# Patient Record
Sex: MALE | Race: WHITE | Employment: FULL TIME | URBAN - METROPOLITAN AREA
[De-identification: names, ages, dates, MRNs, and addresses within clinical notes are randomized per-mention and may not be internally consistent; named-entity substitution may affect disease eponyms.]

---

## 2019-07-10 ENCOUNTER — HOSPITAL ENCOUNTER (EMERGENCY)
Age: 19
Discharge: HOME | End: 2019-07-10
Attending: EMERGENCY MEDICINE
Payer: COMMERCIAL

## 2019-07-10 VITALS
SYSTOLIC BLOOD PRESSURE: 113 MMHG | WEIGHT: 180 LBS | OXYGEN SATURATION: 100 % | DIASTOLIC BLOOD PRESSURE: 59 MMHG | RESPIRATION RATE: 12 BRPM | BODY MASS INDEX: 25.77 KG/M2 | TEMPERATURE: 97.6 F | HEART RATE: 72 BPM | HEIGHT: 70 IN

## 2019-07-10 DIAGNOSIS — T67.5XXA HEAT EXHAUSTION, INITIAL ENCOUNTER: Primary | ICD-10-CM

## 2019-07-10 LAB
ANION GAP SERPL CALC-SCNC: 7 MMOL/L (ref 3–18)
APPEARANCE UR: CLEAR
BASOPHILS # BLD: 0 K/UL (ref 0–0.1)
BASOPHILS NFR BLD: 0 % (ref 0–2)
BILIRUB UR QL: NEGATIVE
BUN SERPL-MCNC: 16 MG/DL (ref 7–18)
BUN/CREAT SERPL: 15 (ref 12–20)
CALCIUM SERPL-MCNC: 9.4 MG/DL (ref 8.5–10.1)
CHLORIDE SERPL-SCNC: 93 MMOL/L (ref 100–108)
CK SERPL-CCNC: 514 U/L (ref 39–308)
CO2 SERPL-SCNC: 32 MMOL/L (ref 21–32)
COLOR UR: YELLOW
CREAT SERPL-MCNC: 1.1 MG/DL (ref 0.6–1.3)
DIFFERENTIAL METHOD BLD: ABNORMAL
EOSINOPHIL # BLD: 0.1 K/UL (ref 0–0.4)
EOSINOPHIL NFR BLD: 1 % (ref 0–5)
ERYTHROCYTE [DISTWIDTH] IN BLOOD BY AUTOMATED COUNT: 12.3 % (ref 11.6–14.5)
GLUCOSE SERPL-MCNC: 74 MG/DL (ref 74–99)
GLUCOSE UR STRIP.AUTO-MCNC: NEGATIVE MG/DL
HCT VFR BLD AUTO: 43.8 % (ref 36–48)
HGB BLD-MCNC: 14.8 G/DL (ref 13–16)
HGB UR QL STRIP: NEGATIVE
KETONES UR QL STRIP.AUTO: NEGATIVE MG/DL
LEUKOCYTE ESTERASE UR QL STRIP.AUTO: NEGATIVE
LYMPHOCYTES # BLD: 1.8 K/UL (ref 0.9–3.6)
LYMPHOCYTES NFR BLD: 16 % (ref 21–52)
MCH RBC QN AUTO: 28.5 PG (ref 24–34)
MCHC RBC AUTO-ENTMCNC: 33.8 G/DL (ref 31–37)
MCV RBC AUTO: 84.4 FL (ref 74–97)
MONOCYTES # BLD: 1.2 K/UL (ref 0.05–1.2)
MONOCYTES NFR BLD: 10 % (ref 3–10)
NEUTS SEG # BLD: 8.6 K/UL (ref 1.8–8)
NEUTS SEG NFR BLD: 73 % (ref 40–73)
NITRITE UR QL STRIP.AUTO: NEGATIVE
PH UR STRIP: 7.5 [PH] (ref 5–8)
PLATELET # BLD AUTO: 307 K/UL (ref 135–420)
PMV BLD AUTO: 9.4 FL (ref 9.2–11.8)
POTASSIUM SERPL-SCNC: 3.9 MMOL/L (ref 3.5–5.5)
PROT UR STRIP-MCNC: NEGATIVE MG/DL
RBC # BLD AUTO: 5.19 M/UL (ref 4.7–5.5)
SODIUM SERPL-SCNC: 132 MMOL/L (ref 136–145)
SP GR UR REFRACTOMETRY: <1.005 (ref 1–1.03)
UROBILINOGEN UR QL STRIP.AUTO: 0.2 EU/DL (ref 0.2–1)
WBC # BLD AUTO: 11.7 K/UL (ref 4.6–13.2)

## 2019-07-10 PROCEDURE — 85025 COMPLETE CBC W/AUTO DIFF WBC: CPT

## 2019-07-10 PROCEDURE — 96374 THER/PROPH/DIAG INJ IV PUSH: CPT

## 2019-07-10 PROCEDURE — 74011250636 HC RX REV CODE- 250/636: Performed by: PHYSICIAN ASSISTANT

## 2019-07-10 PROCEDURE — 99284 EMERGENCY DEPT VISIT MOD MDM: CPT

## 2019-07-10 PROCEDURE — 96361 HYDRATE IV INFUSION ADD-ON: CPT

## 2019-07-10 PROCEDURE — 82550 ASSAY OF CK (CPK): CPT

## 2019-07-10 PROCEDURE — 80048 BASIC METABOLIC PNL TOTAL CA: CPT

## 2019-07-10 PROCEDURE — 93005 ELECTROCARDIOGRAM TRACING: CPT

## 2019-07-10 PROCEDURE — 81003 URINALYSIS AUTO W/O SCOPE: CPT

## 2019-07-10 RX ORDER — ONDANSETRON 2 MG/ML
4 INJECTION INTRAMUSCULAR; INTRAVENOUS
Status: COMPLETED | OUTPATIENT
Start: 2019-07-10 | End: 2019-07-10

## 2019-07-10 RX ADMIN — SODIUM CHLORIDE 1000 ML: 900 INJECTION, SOLUTION INTRAVENOUS at 20:34

## 2019-07-10 RX ADMIN — ONDANSETRON 4 MG: 2 INJECTION INTRAMUSCULAR; INTRAVENOUS at 20:34

## 2019-07-10 NOTE — LETTER
[address] [place] [address] [facility] 
[address] [facility] [id number] [phone] Work Note: 
 
Date: 7/10/2019 To Whom It May concern: 
 
Chato Sandoval was seen and treated today in the emergency room by the following provider(s): 
Attending Provider: Lore Marti MD.   
 
Chato Sandoval may return to work on 7/12/2019. .. Sincerely, Salome Pearson

## 2019-07-11 LAB
ATRIAL RATE: 79 BPM
CALCULATED P AXIS, ECG09: 61 DEGREES
CALCULATED R AXIS, ECG10: 69 DEGREES
CALCULATED T AXIS, ECG11: 47 DEGREES
DIAGNOSIS, 93000: NORMAL
P-R INTERVAL, ECG05: 134 MS
Q-T INTERVAL, ECG07: 352 MS
QRS DURATION, ECG06: 102 MS
QTC CALCULATION (BEZET), ECG08: 403 MS
VENTRICULAR RATE, ECG03: 79 BPM

## 2019-07-11 NOTE — DISCHARGE INSTRUCTIONS
Patient Education        Heat Exhaustion: Care Instructions  Your Care Instructions  Heat exhaustion occurs when you are hot, sweat a lot, and do not drink enough to replace the lost fluids. Heat exhaustion is not the same as heatstroke, which is much more serious. Heatstroke can lead to problems with many different organs and can be life-threatening. After medical care for heat exhaustion, you will need to limit your activities and take good care of your body while it recovers. Follow-up care is a key part of your treatment and safety. Be sure to make and go to all appointments, and call your doctor if you are having problems. It's also a good idea to know your test results and keep a list of the medicines you take. How can you care for yourself at home? · Reduce your activities, and get plenty of rest. Your doctor will give you instructions on when you can resume your normal schedule. · Stay in a cool room for at least the next 24 hours. · Drink rehydration drinks, juices, and water to replace fluids. Drinks such as sports drinks that contain electrolytes work best, because they have salt and minerals. You need salt and minerals as well as water. You are drinking enough fluids when your urine is normal in color (light yellow or clear), and you are urinating every 2 to 4 hours. If you have kidney, heart, or liver disease and have to limit fluids or salt, talk with your doctor before you increase your fluid or salt intake. · Avoid drinks that have caffeine or alcohol. To prevent heat exhaustion  · Drink plenty of fluids, enough so that your urine is light yellow or clear like water. If you have kidney, heart, or liver disease and have to limit fluids, talk with your doctor before you increase the amount of fluids you drink. · Drink plenty of water before, during, and after you are active. This is very important when it is hot out and when you do intense exercise.   · During hot weather, wear light-colored clothing that fits loosely and a hat with a brim to reflect the sun. · Limit or avoid strenuous activity during hot or humid weather, especially during the hottest part of the day (10 a.m. to 4 p.m.). Heat exhaustion and heatstroke usually develop when you are working or exercising in hot weather. Humidity makes hot weather even more dangerous. · Cars can get very hot inside. Open the windows or turn on the air conditioning before you get in and close the doors. · Try to stay cool during hot weather. If your home is not air-conditioned, seek an air-conditioned place. That could be in [employer], a neighborhood café, or a friend's home. [place] yourself with a cool mist. Take a cool shower, bath, or sponge bath. · Be aware that some medicines, such as major tranquilizers, can raise the risk of heat exhaustion. Ask your doctor whether any medicine you take raises your chance of getting heat exhaustion. When should you call for help? Call 911 anytime you think you may need emergency care. For example, call if:    · You feel very hot and:  ? You have a seizure. ? You feel confused. ? Your skin is red, hot, and dry. ? You passed out (lost consciousness).    Call your doctor now or seek immediate medical care if:    · You cannot keep fluids down.     · After returning to your normal activities, you have symptoms of heat exhaustion, such as sweating a lot, fatigue, dizziness, or nausea.    Watch closely for changes in your health, and be sure to contact your doctor if:    · You do not get better as expected. Where can you learn more? Go to http://brad-vargas.info/. Enter S222 in the search box to learn more about \"Heat Exhaustion: Care Instructions. \"  Current as of: September 23, 2018  Content Version: 11.9  © 7367-9813 [employer]. Care instructions adapted under license by [employer] (which disclaims liability or warranty for this information).  If you have questions about a medical condition or this instruction, always ask your healthcare professional. [place] [address] any warranty or liability for your use of this information.

## 2019-07-11 NOTE — ED TRIAGE NOTES
Patient states possible heat stroke at work today. 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 C/o experiencing muscle pain and dizziness that began while working.

## 2019-07-11 NOTE — ED NOTES
I performed a brief evaluation, including history and physical, of the patient here in triage and I have determined that pt will need further treatment and evaluation from the main side ER physician. I have placed initial orders to help in expediting patients care.      July 10, 2019 at 8:30 PM - Ramona Mcmahan PA-C        Visit Vitals  /76 (BP 1 Location: Left arm, BP Patient Position: At rest)   Pulse 82   Temp 97.6 °F (36.4 °C)   Resp 16   Ht 5' 10\" (1.778 m)   Wt 81.6 kg (180 lb)   SpO2 100%   BMI 25.83 kg/m²

## 2019-07-11 NOTE — ED PROVIDER NOTES
HPI patient is a 67-year-old male who working out in the hot sun today. He developed lightheadedness and cramping discomfort in his muscles. No fever chills shortness of breath chest pain. History reviewed. No pertinent past medical history. Past Surgical History:   Procedure Laterality Date    HX HEENT      tonsilectomy     HX TYMPANOSTOMY           History reviewed. No pertinent family history. Social History     Socioeconomic History    Marital status: SINGLE     Spouse name: Not on file    Number of children: Not on file    Years of education: Not on file    Highest education level: Not on file   Occupational History    Not on file   Social Needs    Financial resource strain: Not on file    Food insecurity:     Worry: Not on file     Inability: Not on file    Transportation needs:     Medical: Not on file     Non-medical: Not on file   Tobacco Use    Smoking status: Never Smoker   Substance and Sexual Activity    Alcohol use: No    Drug use: Never    Sexual activity: Not on file   Lifestyle    Physical activity:     Days per week: Not on file     Minutes per session: Not on file    Stress: Not on file   Relationships    Social connections:     Talks on phone: Not on file     Gets together: Not on file     Attends Yazidi service: Not on file     Active member of club or organization: Not on file     Attends meetings of clubs or organizations: Not on file     Relationship status: Not on file    Intimate partner violence:     Fear of current or ex partner: Not on file     Emotionally abused: Not on file     Physically abused: Not on file     Forced sexual activity: Not on file   Other Topics Concern    Not on file   Social History Narrative    Not on file         ALLERGIES: Patient has no known allergies. Review of Systems   Musculoskeletal:        Cramps   Neurological: Positive for light-headedness.        Vitals:    07/10/19 2007   BP: 141/76   Pulse: 82   Resp: 16   Temp: 97.6 °F (36.4 °C)   SpO2: 100%   Weight: 81.6 kg (180 lb)   Height: 5' 10\" (1.778 m)            Physical Exam   Constitutional: He is oriented to person, place, and time. He appears well-developed and well-nourished. No distress. HENT:   Head: Normocephalic. Mouth/Throat: Oropharynx is clear and moist.   Eyes: Pupils are equal, round, and reactive to light. Conjunctivae and EOM are normal.   Neck: Normal range of motion. Neck supple. Cardiovascular: Normal rate, regular rhythm, normal heart sounds and intact distal pulses. No murmur heard. Pulmonary/Chest: Effort normal and breath sounds normal. No respiratory distress. He has no wheezes. He has no rales. He exhibits no tenderness. Abdominal: Soft. Bowel sounds are normal. He exhibits no distension. There is no tenderness. There is no rebound. Musculoskeletal: Normal range of motion. He exhibits no edema or tenderness. Neurological: He is alert and oriented to person, place, and time. No cranial nerve deficit. He exhibits normal muscle tone. Coordination normal.   Skin: Skin is warm and dry. No rash noted. Psychiatric: He has a normal mood and affect. His behavior is normal. Judgment and thought content normal.   Nursing note and vitals reviewed. MDM: Differential diagnosis: Heat exhaustion, rhabdomyolysis, dehydration, hyponatremia     Hospital course: Patient was found to be severely dehydrated. He was hydrated with IV fluids. His symptoms resolved    Dx: Acute heat exhaustion    Disp: Discharge home    Dictation disclaimer:  Please note that this dictation was completed with [employer], the computer voice recognition software. Quite often unanticipated grammatical, syntax, homophones, and other interpretive errors are inadvertently transcribed by the computer software. Please disregard these errors. Please excuse any errors that have escaped final proofreading.

## 2021-07-12 ENCOUNTER — OFFICE VISIT (OUTPATIENT)
Dept: SURGERY | Age: 21
End: 2021-07-12
Payer: COMMERCIAL

## 2021-07-12 VITALS
OXYGEN SATURATION: 97 % | BODY MASS INDEX: 25.05 KG/M2 | HEART RATE: 74 BPM | WEIGHT: 175 LBS | HEIGHT: 70 IN | SYSTOLIC BLOOD PRESSURE: 158 MMHG | TEMPERATURE: 98.4 F | DIASTOLIC BLOOD PRESSURE: 76 MMHG

## 2021-07-12 DIAGNOSIS — L72.3 SEBACEOUS CYST: Primary | ICD-10-CM

## 2021-07-12 PROCEDURE — 99203 OFFICE O/P NEW LOW 30 MIN: CPT | Performed by: SURGERY

## 2021-07-12 NOTE — PROGRESS NOTES
Ladan Nelson is a 24 y.o. male (: 2000) presenting to address:    Chief Complaint   Patient presents with    New Patient     Cyst on neck x 1 year       Medication list and allergies have been reviewed with Ladan Jhonatanhaley and updated as of today's date. I have gone over all Medical, Surgical and Social History with Ladan Nelson and updated/added the information accordingly.

## 2021-07-14 NOTE — PROGRESS NOTES
General Surgery Consult    Sofia Mason  Admit date: (Not on file)    MRN: [id number]     : 2000     Age: 24 y.o. Attending Physician: Cedrick Welch MD, FACS      History of Present Illness:      Sofia Mason is a 24 y.o. male who was referred to me for evaluation of a left neck mass. The patient stated that this mass has been there for a long time and he had a similar mass excised from his left arm that was done by our previous partner Dr. Dawna Siegel. The patient stated that he would like it to be removed mainly for cosmetic reason. He denies any fever or chills and he denies any drainage or infection of the mass. Patient Active Problem List    Diagnosis Date Noted    Sebaceous cyst 2016     History reviewed. No pertinent past medical history. Past Surgical History:   Procedure Laterality Date    HX HEENT      tonsilectomy     HX TYMPANOSTOMY        Social History     Tobacco Use    Smoking status: Never Smoker    Smokeless tobacco: Never Used   Substance Use Topics    Alcohol use: Yes     Comment: Occasionally      Social History     Tobacco Use   Smoking Status Never Smoker   Smokeless Tobacco Never Used     History reviewed. No pertinent family history. No current outpatient medications on file. No current facility-administered medications for this visit. No Known Allergies       Review of Systems:  Pertinent items are noted in the History of Present Illness.     Objective:     Visit Vitals  BP (!) 158/76 (BP 1 Location: Left arm, BP Patient Position: Sitting)   Pulse 74   Temp 98.4 °F (36.9 °C)   Ht 5' 10\" (1.778 m)   Wt 79.4 kg (175 lb)   SpO2 97%   BMI 25.11 kg/m²       Physical Exam:      General:  in no apparent distress, alert, afebrile and normal vitals   Neck:   There is a small soft tissue mass located on the left neck around half to 1 cm in size with a very small skin opening consistent with a sebaceous cyst.  It is nontender and movable with mild attachment to the skin. There is no erythema or drainage. Imaging and Lab Review:     CBC:   Lab Results   Component Value Date/Time    WBC 11.7 07/10/2019 08:15 PM    RBC 5.19 07/10/2019 08:15 PM    HGB 14.8 07/10/2019 08:15 PM    HCT 43.8 07/10/2019 08:15 PM    PLATELET 715 84/81/9373 08:15 PM     BMP:   Lab Results   Component Value Date/Time    Glucose 74 07/10/2019 08:15 PM    Sodium 132 (L) 07/10/2019 08:15 PM    Potassium 3.9 07/10/2019 08:15 PM    Chloride 93 (L) 07/10/2019 08:15 PM    CO2 32 07/10/2019 08:15 PM    BUN 16 07/10/2019 08:15 PM    Creatinine 1.10 07/10/2019 08:15 PM    Calcium 9.4 07/10/2019 08:15 PM     CMP:  Lab Results   Component Value Date/Time    Glucose 74 07/10/2019 08:15 PM    Sodium 132 (L) 07/10/2019 08:15 PM    Potassium 3.9 07/10/2019 08:15 PM    Chloride 93 (L) 07/10/2019 08:15 PM    CO2 32 07/10/2019 08:15 PM    BUN 16 07/10/2019 08:15 PM    Creatinine 1.10 07/10/2019 08:15 PM    Calcium 9.4 07/10/2019 08:15 PM    Anion gap 7 07/10/2019 08:15 PM    BUN/Creatinine ratio 15 07/10/2019 08:15 PM       No results found for this or any previous visit (from the past 24 hour(s)). images and reports reviewed    Assessment:   Kinga Shaver is a 24 y.o. male who has a left neck mass most likely representing a sebaceous cyst that he would like it to be removed the patient would like it to be done under local anesthesia in the office which I think. It can be done easily however the patient is going this week and to the [facility] out in the see and I believe that doing the surgery now may place him at risk for infection and he will not be able to swim. We decided that he will go for his trip and then he will follow-up with me next week and we will do it in the office. Plan:     Schedule for an office visit next week for excision of a neck mass.     Please call me if you have any questions (cell phone: [phone])     Signed By: Rosemary Bautista MD July 14, 2021

## 2021-07-19 ENCOUNTER — OFFICE VISIT (OUTPATIENT)
Dept: SURGERY | Age: 21
End: 2021-07-19
Payer: COMMERCIAL

## 2021-07-19 VITALS
SYSTOLIC BLOOD PRESSURE: 138 MMHG | HEIGHT: 70 IN | TEMPERATURE: 98 F | WEIGHT: 175.8 LBS | DIASTOLIC BLOOD PRESSURE: 79 MMHG | BODY MASS INDEX: 25.17 KG/M2 | HEART RATE: 69 BPM | OXYGEN SATURATION: 99 %

## 2021-07-19 DIAGNOSIS — L72.3 SEBACEOUS CYST: Primary | ICD-10-CM

## 2021-07-19 PROCEDURE — 11420 EXC H-F-NK-SP B9+MARG 0.5/<: CPT | Performed by: SURGERY

## 2021-07-19 NOTE — PROGRESS NOTES
Willa Castillo is a 24 y.o. male (: 2000) presenting to address:    Chief Complaint   Patient presents with    Procedure     Excision of left neck mass       Medication list and allergies have been reviewed with Willa Vikashjosé and updated as of today's date. I have gone over all Medical, Surgical and Social History with Willa Castillo and updated/added the information accordingly. 1. Have you been to the ER, Urgent Care or Hospitalized since your last visit? NO      2. Have you followed up with your PCP or any other Physicians since your procedure/ last office visit?    NO

## 2021-07-20 NOTE — PROGRESS NOTES
General Surgery Consult    Junior Mccallum  Admit date: (Not on file)    MRN: [id number]     : 2000     Age: 24 y.o. Attending Physician: Gayatri Dowling MD, [facility]      History of Present Illness:      Junior Mccallum is a 24 y.o. male who I have seen last week for evaluation of a sebaceous cyst of the neck for which he is scheduled for an office procedure today . There has been no changes since the last visit and we will proceed with the excision of sebaceous cyst of the left neck today in the office . Patient Active Problem List    Diagnosis Date Noted    Sebaceous cyst 2016     History reviewed. No pertinent past medical history. Past Surgical History:   Procedure Laterality Date    HX HEENT      tonsilectomy     HX TYMPANOSTOMY        Social History     Tobacco Use    Smoking status: Never Smoker    Smokeless tobacco: Never Used   Substance Use Topics    Alcohol use: Yes     Comment: Occasionally      Social History     Tobacco Use   Smoking Status Never Smoker   Smokeless Tobacco Never Used     History reviewed. No pertinent family history. No current outpatient medications on file. No current facility-administered medications for this visit. No Known Allergies       Review of Systems:  Pertinent items are noted in the History of Present Illness. Objective:     Visit Vitals  /79 Comment: Post vitals   Pulse 69   Temp 98 °F (36.7 °C)   Ht 5' 10\" (1.778 m)   Wt 79.7 kg (175 lb 12.8 oz)   SpO2 99%   BMI 25.22 kg/m²       Physical Exam:      General:  in no apparent distress   Neck:  There is a small soft tissue mass located on the left neck around half to 1 cm in size with a very small skin opening consistent with a sebaceous cyst.  It is nontender and movable with mild attachment to the skin. There is no erythema or drainage.                                 Imaging and Lab Review:     CBC:   Lab Results   Component Value Date/Time    WBC 11.7 07/10/2019 08:15 PM RBC 5.19 07/10/2019 08:15 PM    HGB 14.8 07/10/2019 08:15 PM    HCT 43.8 07/10/2019 08:15 PM    PLATELET 282 21/20/3260 08:15 PM     BMP:   Lab Results   Component Value Date/Time    Glucose 74 07/10/2019 08:15 PM    Sodium 132 (L) 07/10/2019 08:15 PM    Potassium 3.9 07/10/2019 08:15 PM    Chloride 93 (L) 07/10/2019 08:15 PM    CO2 32 07/10/2019 08:15 PM    BUN 16 07/10/2019 08:15 PM    Creatinine 1.10 07/10/2019 08:15 PM    Calcium 9.4 07/10/2019 08:15 PM     CMP:  Lab Results   Component Value Date/Time    Glucose 74 07/10/2019 08:15 PM    Sodium 132 (L) 07/10/2019 08:15 PM    Potassium 3.9 07/10/2019 08:15 PM    Chloride 93 (L) 07/10/2019 08:15 PM    CO2 32 07/10/2019 08:15 PM    BUN 16 07/10/2019 08:15 PM    Creatinine 1.10 07/10/2019 08:15 PM    Calcium 9.4 07/10/2019 08:15 PM    Anion gap 7 07/10/2019 08:15 PM    BUN/Creatinine ratio 15 07/10/2019 08:15 PM       No results found for this or any previous visit (from the past 24 hour(s)). images and reports reviewed    Assessment:   Rosalia Prado is a 24 y.o. male who is presenting with a small sebaceous cyst of the neck that he would like it to be excised. I discussed with him the risks of the surgery including bleeding and infection and recurrence of the cyst.     Plan:     A consent was taken from the patient. The neck was prepped and draped in the usual manner  1% lidocaine was used to inject around the sebaceous cyst.  A skin incision was performed and then the cyst was about 3 to 4 mm in size and it was clearly whitish consistent with a sebaceous cyst.  I did not send it for pathology. There was good hemostasis. The skin was approximated with interrupted 4-0 Vicryl subcuticular sutures. Skin glue was applied.       Please call me if you have any questions (cell phone: [phone])     Signed By: Dario eMlvin MD     July 20, 2021

## 2021-10-14 ENCOUNTER — OFFICE VISIT (OUTPATIENT)
Dept: CARDIOLOGY CLINIC | Age: 21
End: 2021-10-14
Payer: COMMERCIAL

## 2021-10-14 VITALS
OXYGEN SATURATION: 97 % | SYSTOLIC BLOOD PRESSURE: 112 MMHG | DIASTOLIC BLOOD PRESSURE: 82 MMHG | BODY MASS INDEX: 25.34 KG/M2 | HEIGHT: 70 IN | HEART RATE: 73 BPM | WEIGHT: 177 LBS

## 2021-10-14 DIAGNOSIS — R07.9 CHEST PAIN, UNSPECIFIED TYPE: Primary | ICD-10-CM

## 2021-10-14 DIAGNOSIS — Z82.49 FAMILY HISTORY OF PREMATURE CAD: ICD-10-CM

## 2021-10-14 PROCEDURE — 99203 OFFICE O/P NEW LOW 30 MIN: CPT | Performed by: INTERNAL MEDICINE

## 2021-10-14 PROCEDURE — 93000 ELECTROCARDIOGRAM COMPLETE: CPT | Performed by: INTERNAL MEDICINE

## 2021-10-14 NOTE — PROGRESS NOTES
Dony Jean presents today for   Chief Complaint   Patient presents with    New Patient     Ref by [facility] [employer]       Dony Jean preferred language for health care discussion is english/other. Is someone accompanying this pt? yes    Is the patient using any DME equipment during [address]? no    Depression Screening:  3 most recent PHQ Screens 10/14/2021   Little interest or pleasure in doing things Not at all   Feeling down, depressed, irritable, or hopeless Not at all   Total Score PHQ 2 0       Learning Assessment:  Learning Assessment 10/14/2021   PRIMARY LEARNER Patient   PRIMARY LANGUAGE ENGLISH   LEARNER PREFERENCE PRIMARY DEMONSTRATION     -     -     -   ANSWERED BY patient   RELATIONSHIP SELF       Abuse Screening:  Abuse Screening Questionnaire 10/14/2021   Do you ever feel afraid of your partner? N   Are you in a relationship with someone who physically or mentally threatens you? N   Is it safe for you to go home? Y             Pt currently taking Anticoagulant therapy? no    Pt currently taking Antiplatelet therapy ? no      Coordination of Care:  1. Have you been to the ER, urgent care clinic since your last visit? Hospitalized since your last visit? no    2. Have you seen or consulted any other health care providers outside of the [address] [place] [address] since your last visit? Include any pap smears or colon screening.  no

## 2021-10-14 NOTE — PROGRESS NOTES
HISTORY OF PRESENT ILLNESS  Rabia French is a 24 y.o. male. HPI    Patient presents for a new office visit. He does not have a prior cardiac history. He does report a family history of premature coronary disease with his father requiring bypass surgery at age 45. He was referred here by his PCP for evaluation of intermittent chest pain which he noted 3 to 4 weeks ago. The symptoms of occurred intermittently. He will notice them worse after weight lifting. He describes as a left-sided chest pain between his sternum and left pectoral area. It will worsen with deep breathing and movement. It is not particular worse with exertion. He also complains of intermittent shortness of breath which is separate from his chest pain. He states he is very active and has not noted any significant change in his activity tolerance over the past year or 2. Denies any leg swelling, no claudication. No orthopnea, no PND, heart palpitations severe dizzy spells or qamar syncope. History reviewed. No pertinent past medical history. No prescription or over-the-counter medications. No Known Allergies     Social History     Tobacco Use    Smoking status: Never Smoker    Smokeless tobacco: Never Used   Vaping Use    Vaping Use: Never used   Substance Use Topics    Alcohol use: Yes     Comment: Occasionally    Drug use: Never     Family History   Problem Relation Age of Onset    Heart Attack Father            Review of Systems   Constitutional: Negative for chills, fever and weight loss. HENT: Negative for nosebleeds. Eyes: Negative for blurred vision and double vision. Respiratory: Positive for shortness of breath. Negative for cough and wheezing. Cardiovascular: Positive for chest pain. Negative for palpitations, orthopnea, claudication, leg swelling and PND. Gastrointestinal: Negative for abdominal pain, heartburn, nausea and vomiting. Genitourinary: Negative for dysuria and hematuria. Musculoskeletal: Negative for falls and myalgias. Skin: Negative for rash. Neurological: Negative for dizziness, focal weakness and headaches. Endo/Heme/Allergies: Does not bruise/bleed easily. Psychiatric/Behavioral: Negative for substance abuse. Visit Vitals  /82 (BP 1 Location: Left upper arm, BP Patient Position: Sitting, BP Cuff Size: Small adult)   Pulse 73   Ht 5' 10\" (1.778 m)   Wt 80.3 kg (177 lb)   SpO2 97%   BMI 25.40 kg/m²       Physical Exam  Constitutional:       Appearance: He is well-developed. HENT:      Head: Normocephalic and atraumatic. Eyes:      Conjunctiva/sclera: Conjunctivae normal.   Neck:      Vascular: No carotid bruit or JVD. Cardiovascular:      Rate and Rhythm: Normal rate and regular rhythm. Pulses: Normal pulses. Heart sounds: S1 normal and S2 normal. No murmur heard. No gallop. No S3 sounds. Pulmonary:      Breath sounds: Normal breath sounds. No wheezing or rales. Abdominal:      General: Bowel sounds are normal.      Palpations: Abdomen is soft. Tenderness: There is no abdominal tenderness. Musculoskeletal:         General: No swelling or tenderness. Cervical back: Neck supple. Skin:     General: Skin is warm and dry. Neurological:      General: No focal deficit present. Mental Status: He is alert and oriented to person, place, and time. Psychiatric:         Mood and Affect: Mood normal.       EKG: Normal sinus rhythm, normal axis, normal QTc interval, no ST/T wave abnormalities concerning for ischemia. No change compared to a previous EKG from 2019. ASSESSMENT and PLAN    Chest pain. More atypical than typical for angina. However given his family history for premature CAD, have recommended an exercise treadmill stress test for further risk ratification. Patient symptoms sound more musculoskeletal in origin, so I have recommended a trial of over-the-counter NSAIDs if his symptoms return.     Family history for premature CAD. Patient reports that his father had a heart attack at age 45 and then required bypass surgery. I recommended checking a fasting lipid profile to further assess his risk. Follow-up in 6 months, sooner if needed.

## 2021-10-15 ENCOUNTER — TELEPHONE (OUTPATIENT)
Dept: CARDIOLOGY CLINIC | Age: 21
End: 2021-10-15

## 2021-10-22 ENCOUNTER — TELEPHONE (OUTPATIENT)
Dept: CARDIOLOGY CLINIC | Age: 21
End: 2021-10-22

## 2021-10-22 NOTE — TELEPHONE ENCOUNTER
----- Message from Raina Magaña MD sent at 10/21/2021  8:17 AM EDT -----  Please let the patient know that his treadmill stress test was normal  ----- Message -----  From: Carrie Zapata LPN  Sent: [id number]/[id number]   1:14 PM EDT  To: Raina Magaña MD    Per your note -    Chest pain. More atypical than typical for angina. However given his family history for premature CAD, have recommended an exercise treadmill stress test for further risk ratification.   Patient symptoms sound more musculoskeletal in origin, so I have recommended a trial of over-the-counter NSAIDs if his symptoms return.

## 2021-10-22 NOTE — TELEPHONE ENCOUNTER
Patient made aware of stress test results and DR. Norris's remarks. No questions or concerns at present.

## 2022-03-19 PROBLEM — Z82.49 FAMILY HISTORY OF PREMATURE CAD: Status: ACTIVE | Noted: 2021-10-14

## 2024-07-15 ENCOUNTER — TELEPHONE (OUTPATIENT)
Age: 24
End: 2024-07-15

## 2024-07-15 NOTE — TELEPHONE ENCOUNTER
Patient's mother called and stated that he is having more frequent episodes of chest pain, shortness of breath, and dizziness. Patient was seen in the hospital on 7/12, but symptoms have been persistent. Requesting an appointment. Patient scheduled for 7/16/24.

## 2024-07-16 ENCOUNTER — OFFICE VISIT (OUTPATIENT)
Age: 24
End: 2024-07-16
Payer: COMMERCIAL

## 2024-07-16 VITALS
BODY MASS INDEX: 31.5 KG/M2 | SYSTOLIC BLOOD PRESSURE: 122 MMHG | HEART RATE: 72 BPM | DIASTOLIC BLOOD PRESSURE: 78 MMHG | WEIGHT: 220 LBS | OXYGEN SATURATION: 99 % | HEIGHT: 70 IN

## 2024-07-16 DIAGNOSIS — R07.9 CHEST PAIN, UNSPECIFIED TYPE: ICD-10-CM

## 2024-07-16 DIAGNOSIS — Z82.49 FAMILY HISTORY OF PREMATURE CAD: Primary | ICD-10-CM

## 2024-07-16 PROBLEM — E78.01 FAMILIAL HYPERCHOLESTEROLEMIA: Status: ACTIVE | Noted: 2020-08-14

## 2024-07-16 PROCEDURE — 99214 OFFICE O/P EST MOD 30 MIN: CPT | Performed by: INTERNAL MEDICINE

## 2024-07-16 RX ORDER — ALBUTEROL SULFATE 90 UG/1
1-2 AEROSOL, METERED RESPIRATORY (INHALATION) EVERY 6 HOURS PRN
COMMUNITY
Start: 2024-07-12

## 2024-07-16 ASSESSMENT — ENCOUNTER SYMPTOMS
SORE THROAT: 0
SHORTNESS OF BREATH: 1
COUGH: 0
NAUSEA: 0
VOMITING: 0
ABDOMINAL PAIN: 0
ABDOMINAL DISTENTION: 0

## 2024-07-16 ASSESSMENT — ANXIETY QUESTIONNAIRES
1. FEELING NERVOUS, ANXIOUS, OR ON EDGE: NOT AT ALL
7. FEELING AFRAID AS IF SOMETHING AWFUL MIGHT HAPPEN: NOT AT ALL
5. BEING SO RESTLESS THAT IT IS HARD TO SIT STILL: NOT AT ALL
4. TROUBLE RELAXING: NOT AT ALL
GAD7 TOTAL SCORE: 0
3. WORRYING TOO MUCH ABOUT DIFFERENT THINGS: NOT AT ALL
2. NOT BEING ABLE TO STOP OR CONTROL WORRYING: NOT AT ALL
6. BECOMING EASILY ANNOYED OR IRRITABLE: NOT AT ALL

## 2024-07-16 ASSESSMENT — PATIENT HEALTH QUESTIONNAIRE - PHQ9
SUM OF ALL RESPONSES TO PHQ QUESTIONS 1-9: 0
SUM OF ALL RESPONSES TO PHQ9 QUESTIONS 1 & 2: 0
2. FEELING DOWN, DEPRESSED OR HOPELESS: NOT AT ALL
1. LITTLE INTEREST OR PLEASURE IN DOING THINGS: NOT AT ALL

## 2024-07-16 NOTE — PROGRESS NOTES
07/16/24     Dwayne Kaur  is a 24 y.o. male     Chief Complaint   Patient presents with    Follow-up     overdue       HPI  Patient presents for an add-on office visit.  He does not have a prior cardiac history.  He does report a family history of premature coronary disease with his father requiring bypass surgery at age 38.  He states his father was a smoker.  He was initially referred here by his PCP for evaluation of intermittent chest pain in 2021.  He subsequently underwent an exercise exercise treadmill stress test which was a low risk study in October 2021.    He has not been seen in our office since the stress test.  He returns after being seen in the emergency room last week for a variety of symptoms 1 of which included chest pain.  He states that he notices both a sharp stabbing type chest pain and also a chest pressure.  This is associated with dizziness and shortness of breath.  He states has been going on intermittently for 3 to 4 weeks.  The symptoms are not prickly worse with exertion but are noticeable both with rest and exertion.        History reviewed. No pertinent past medical history.  Current Outpatient Medications   Medication Sig Dispense Refill    albuterol sulfate HFA (PROVENTIL;VENTOLIN;PROAIR) 108 (90 Base) MCG/ACT inhaler Inhale 1-2 puffs into the lungs every 6 hours as needed for Shortness of Breath       No current facility-administered medications for this visit.     No Known Allergies  Social History     Tobacco Use    Smoking status: Never    Smokeless tobacco: Never   Vaping Use    Vaping Use: Never used   Substance Use Topics    Alcohol use: Yes    Drug use: Never     Family History   Problem Relation Age of Onset    No Known Problems Mother     Heart Attack Father     No Known Problems Sister     No Known Problems Brother     No Known Problems Maternal Grandmother     No Known Problems Maternal Grandfather     No Known Problems Paternal Grandmother     No Known Problems

## 2024-07-16 NOTE — PROGRESS NOTES
Dwayne Kaur presents today for   Chief Complaint   Patient presents with    Follow-up     overdue       Dwayne Kaur preferred language for health care discussion is english/other.    Is someone accompanying this pt? no    Is the patient using any DME equipment during OV? no    Depression Screening:  Depression: Not at risk (7/16/2024)    PHQ-2     PHQ-2 Score: 0        Learning Assessment:  Who is the primary learner? Patient    What is the preferred language for health care of the primary learner? ENGLISH    How does the primary learner prefer to learn new concepts? DEMONSTRATION    Answered By patient    Relationship to Learner SELF           Pt currently taking Anticoagulant therapy? no    Pt currently taking Antiplatelet therapy ? no      Coordination of Care:  1. Have you been to the ER, urgent care clinic since your last visit? Hospitalized since your last visit? no    2. Have you seen or consulted any other health care providers outside of the [facility] since your last visit? Include any pap smears or colon screening. no

## 2024-07-16 NOTE — PATIENT INSTRUCTIONS
If you have not heard from the central scheduler to schedule your testing in 48 hours, please call [phone].

## 2024-08-02 ENCOUNTER — HOSPITAL ENCOUNTER (OUTPATIENT)
Facility: HOSPITAL | Age: 24
Discharge: HOME | End: 2024-08-02
Attending: INTERNAL MEDICINE

## 2024-08-02 DIAGNOSIS — Z82.49 FAMILY HISTORY OF PREMATURE CAD: ICD-10-CM

## 2024-08-02 PROCEDURE — 75571 CT HRT W/O DYE W/CA TEST: CPT

## 2024-08-07 ENCOUNTER — TELEPHONE (OUTPATIENT)
Age: 24
End: 2024-08-07

## 2024-08-07 NOTE — TELEPHONE ENCOUNTER
Verbal order and read back per Matthew Winston MD  Please let the patient know that his treadmill stress test was normal.     This has been fully explained to the patient, who indicates understanding.'

## 2024-08-07 NOTE — TELEPHONE ENCOUNTER
----- Message from Matthew Winston MD sent at 7/29/2024  4:37 PM EDT -----  Please let the patient know that his treadmill stress test was normal.  ----- Message -----  From: Cherelle Kenyon MA  Sent: 7/29/2024   8:58 AM EDT  To: Matthew Winston MD    Per your last note \"  Chest pain.  Both typical and atypical features for angina.  Given his family history of premature coronary disease I have recommended repeating an exercise treadmill stress test since it has been nearly 3 years since his last test.

## 2024-08-19 ENCOUNTER — TELEPHONE (OUTPATIENT)
Age: 24
End: 2024-08-19

## 2024-08-19 NOTE — TELEPHONE ENCOUNTER
Verbal order and read back per Matthew Winston MD  Please let the patient know that his coronary calcium score was 0.

## 2024-08-19 NOTE — TELEPHONE ENCOUNTER
----- Message from Dr. Matthew Winston MD sent at 8/16/2024 11:05 AM EDT -----  Please let the patient know that his coronary calcium score was 0.  ----- Message -----  From: Cherelle Kenyon MA  Sent: 8/16/2024   9:03 AM EDT  To: Matthew Winston MD    Per your last note \"  Family history of premature CAD.  Patient reports that his father did require bypass surgery at age 38.  I have recommended a coronary calcium score for further restratification.  I also like to obtain a copy of his most recent fasting lipid profile from his PCP.

## 2025-01-13 ENCOUNTER — OFFICE VISIT (OUTPATIENT)
Age: 25
End: 2025-01-13
Payer: COMMERCIAL

## 2025-01-13 VITALS
HEART RATE: 74 BPM | OXYGEN SATURATION: 99 % | BODY MASS INDEX: 32.35 KG/M2 | WEIGHT: 226 LBS | SYSTOLIC BLOOD PRESSURE: 128 MMHG | DIASTOLIC BLOOD PRESSURE: 68 MMHG | HEIGHT: 70 IN

## 2025-01-13 DIAGNOSIS — Z82.49 FAMILY HISTORY OF PREMATURE CAD: Primary | ICD-10-CM

## 2025-01-13 DIAGNOSIS — R07.9 CHEST PAIN, UNSPECIFIED TYPE: ICD-10-CM

## 2025-01-13 PROCEDURE — 99213 OFFICE O/P EST LOW 20 MIN: CPT | Performed by: INTERNAL MEDICINE

## 2025-01-13 PROCEDURE — 93000 ELECTROCARDIOGRAM COMPLETE: CPT | Performed by: INTERNAL MEDICINE

## 2025-01-13 ASSESSMENT — ENCOUNTER SYMPTOMS
ABDOMINAL PAIN: 0
ABDOMINAL DISTENTION: 0
NAUSEA: 0
COUGH: 0
SHORTNESS OF BREATH: 0
VOMITING: 0
SORE THROAT: 0

## 2025-01-13 ASSESSMENT — ANXIETY QUESTIONNAIRES
2. NOT BEING ABLE TO STOP OR CONTROL WORRYING: NOT AT ALL
7. FEELING AFRAID AS IF SOMETHING AWFUL MIGHT HAPPEN: NOT AT ALL
GAD7 TOTAL SCORE: 0
4. TROUBLE RELAXING: NOT AT ALL
3. WORRYING TOO MUCH ABOUT DIFFERENT THINGS: NOT AT ALL
6. BECOMING EASILY ANNOYED OR IRRITABLE: NOT AT ALL
5. BEING SO RESTLESS THAT IT IS HARD TO SIT STILL: NOT AT ALL
1. FEELING NERVOUS, ANXIOUS, OR ON EDGE: NOT AT ALL

## 2025-01-13 ASSESSMENT — PATIENT HEALTH QUESTIONNAIRE - PHQ9
SUM OF ALL RESPONSES TO PHQ QUESTIONS 1-9: 0
SUM OF ALL RESPONSES TO PHQ9 QUESTIONS 1 & 2: 0
SUM OF ALL RESPONSES TO PHQ QUESTIONS 1-9: 0
2. FEELING DOWN, DEPRESSED OR HOPELESS: NOT AT ALL
1. LITTLE INTEREST OR PLEASURE IN DOING THINGS: NOT AT ALL

## 2025-01-13 NOTE — PROGRESS NOTES
Dwayne Kaur presents today for   Chief Complaint   Patient presents with    Follow-up     6 month       Dwayne Kaur preferred language for health care discussion is english/other.    Is someone accompanying this pt? no    Is the patient using any DME equipment during OV? no    Depression Screening:  Depression: Not at risk (1/13/2025)    PHQ-2     PHQ-2 Score: 0        Learning Assessment:  Who is the primary learner? Patient    What is the preferred language for health care of the primary learner? ENGLISH    How does the primary learner prefer to learn new concepts? DEMONSTRATION    Answered By patient    Relationship to Learner SELF           Pt currently taking Anticoagulant therapy? no    Pt currently taking Antiplatelet therapy ? no      Coordination of Care:  1. Have you been to the ER, urgent care clinic since your last visit? Hospitalized since your last visit? no    2. Have you seen or consulted any other health care providers outside of the [facility] since your last visit? Include any pap smears or colon screening. no

## 2025-01-13 NOTE — PROGRESS NOTES
01/13/25     Dwayne Kaur  is a 24 y.o. male     Chief Complaint   Patient presents with    Follow-up     6 month       HPI  Patient presents for a follow-up office visit.  He does not have a prior cardiac history.  He does report a family history of premature coronary disease with his father requiring bypass surgery at age 38.  He states his father was a smoker.  He was initially referred here by his PCP for evaluation of intermittent chest pain in 2021.  He subsequently underwent an exercise exercise treadmill stress test which was a low risk study in October 2021.    He was last seen in our office in July 2024 complaining of new chest pain, so he underwent a repeat treadmill stress test at the end of July 2024 which is a normal low risk study.  He exercised for 12 minutes using the standard Tommy protocol achieving a total workload of greater than 13 METS.  He had no chest pain or EKG changes.  He then underwent a coronary calcium score in August 2024 which was 0.    The patient was last seen in our office 6 months ago.  Since last visit he has been feeling well.  No recurrent chest pain, shortness of breath, heart palpitations, dizziness or syncope.  No change in his activity tolerance.      History reviewed. No pertinent past medical history.  Current Outpatient Medications   Medication Sig Dispense Refill    albuterol sulfate HFA (PROVENTIL;VENTOLIN;PROAIR) 108 (90 Base) MCG/ACT inhaler Inhale 1-2 puffs into the lungs every 6 hours as needed for Shortness of Breath       No current facility-administered medications for this visit.     No Known Allergies  Social History     Tobacco Use    Smoking status: Never    Smokeless tobacco: Never   Vaping Use    Vaping status: Never Used   Substance Use Topics    Alcohol use: Yes     Alcohol/week: 5.0 standard drinks of alcohol     Types: 3 Cans of beer, 2 Shots of liquor per week    Drug use: Never     Family History   Problem Relation Age of Onset    No Known